# Patient Record
Sex: FEMALE | Race: BLACK OR AFRICAN AMERICAN | Employment: PART TIME | URBAN - METROPOLITAN AREA
[De-identification: names, ages, dates, MRNs, and addresses within clinical notes are randomized per-mention and may not be internally consistent; named-entity substitution may affect disease eponyms.]

---

## 2018-09-09 ENCOUNTER — HOSPITAL ENCOUNTER (EMERGENCY)
Age: 38
Discharge: HOME | End: 2018-09-09
Attending: EMERGENCY MEDICINE
Payer: SELF-PAY

## 2018-09-09 ENCOUNTER — APPOINTMENT (OUTPATIENT)
Dept: GENERAL RADIOLOGY | Age: 38
End: 2018-09-09
Attending: NURSE PRACTITIONER
Payer: SELF-PAY

## 2018-09-09 VITALS
HEART RATE: 99 BPM | BODY MASS INDEX: 20.09 KG/M2 | OXYGEN SATURATION: 100 % | SYSTOLIC BLOOD PRESSURE: 125 MMHG | WEIGHT: 125 LBS | DIASTOLIC BLOOD PRESSURE: 84 MMHG | TEMPERATURE: 97.4 F | RESPIRATION RATE: 20 BRPM | HEIGHT: 66 IN

## 2018-09-09 DIAGNOSIS — R09.82 POST-NASAL DRIP: ICD-10-CM

## 2018-09-09 DIAGNOSIS — J01.00 ACUTE NON-RECURRENT MAXILLARY SINUSITIS: Primary | ICD-10-CM

## 2018-09-09 DIAGNOSIS — R03.0 ELEVATED BLOOD PRESSURE READING: ICD-10-CM

## 2018-09-09 LAB
ANION GAP SERPL CALC-SCNC: 9 MMOL/L (ref 3–18)
BASOPHILS # BLD: 0 K/UL (ref 0–0.1)
BASOPHILS NFR BLD: 0 % (ref 0–2)
BUN SERPL-MCNC: 10 MG/DL (ref 7–18)
BUN/CREAT SERPL: 12 (ref 12–20)
CALCIUM SERPL-MCNC: 9.6 MG/DL (ref 8.5–10.1)
CHLORIDE SERPL-SCNC: 105 MMOL/L (ref 100–108)
CK MB CFR SERPL CALC: NORMAL % (ref 0–4)
CK MB SERPL-MCNC: <1 NG/ML (ref 5–25)
CK SERPL-CCNC: 122 U/L (ref 26–192)
CO2 SERPL-SCNC: 25 MMOL/L (ref 21–32)
CREAT SERPL-MCNC: 0.86 MG/DL (ref 0.6–1.3)
D DIMER PPP FEU-MCNC: <0.27 UG/ML(FEU)
DIFFERENTIAL METHOD BLD: ABNORMAL
EOSINOPHIL # BLD: 0.1 K/UL (ref 0–0.4)
EOSINOPHIL NFR BLD: 1 % (ref 0–5)
ERYTHROCYTE [DISTWIDTH] IN BLOOD BY AUTOMATED COUNT: 12.3 % (ref 11.6–14.5)
GLUCOSE SERPL-MCNC: 107 MG/DL (ref 74–99)
HCT VFR BLD AUTO: 41.3 % (ref 35–45)
HGB BLD-MCNC: 14.3 G/DL (ref 12–16)
LYMPHOCYTES # BLD: 1.4 K/UL (ref 0.9–3.6)
LYMPHOCYTES NFR BLD: 13 % (ref 21–52)
MCH RBC QN AUTO: 33.3 PG (ref 24–34)
MCHC RBC AUTO-ENTMCNC: 34.6 G/DL (ref 31–37)
MCV RBC AUTO: 96 FL (ref 74–97)
MONOCYTES # BLD: 0.7 K/UL (ref 0.05–1.2)
MONOCYTES NFR BLD: 7 % (ref 3–10)
NEUTS SEG # BLD: 8.3 K/UL (ref 1.8–8)
NEUTS SEG NFR BLD: 79 % (ref 40–73)
PLATELET # BLD AUTO: 287 K/UL (ref 135–420)
PMV BLD AUTO: 9.2 FL (ref 9.2–11.8)
POTASSIUM SERPL-SCNC: 4.1 MMOL/L (ref 3.5–5.5)
RBC # BLD AUTO: 4.3 M/UL (ref 4.2–5.3)
SODIUM SERPL-SCNC: 139 MMOL/L (ref 136–145)
TROPONIN I SERPL-MCNC: <0.02 NG/ML (ref 0–0.04)
WBC # BLD AUTO: 10.5 K/UL (ref 4.6–13.2)

## 2018-09-09 PROCEDURE — 99284 EMERGENCY DEPT VISIT MOD MDM: CPT

## 2018-09-09 PROCEDURE — 85379 FIBRIN DEGRADATION QUANT: CPT | Performed by: NURSE PRACTITIONER

## 2018-09-09 PROCEDURE — 80048 BASIC METABOLIC PNL TOTAL CA: CPT | Performed by: NURSE PRACTITIONER

## 2018-09-09 PROCEDURE — 71046 X-RAY EXAM CHEST 2 VIEWS: CPT

## 2018-09-09 PROCEDURE — 82550 ASSAY OF CK (CPK): CPT | Performed by: NURSE PRACTITIONER

## 2018-09-09 PROCEDURE — 74011250637 HC RX REV CODE- 250/637: Performed by: EMERGENCY MEDICINE

## 2018-09-09 PROCEDURE — 93005 ELECTROCARDIOGRAM TRACING: CPT

## 2018-09-09 PROCEDURE — 85025 COMPLETE CBC W/AUTO DIFF WBC: CPT | Performed by: NURSE PRACTITIONER

## 2018-09-09 RX ORDER — LORATADINE 10 MG/1
TABLET ORAL
Qty: 30 TAB | Refills: 0 | Status: SHIPPED | OUTPATIENT
Start: 2018-09-09

## 2018-09-09 RX ORDER — DIPHENHYDRAMINE HCL 50 MG
50 CAPSULE ORAL
Status: COMPLETED | OUTPATIENT
Start: 2018-09-09 | End: 2018-09-09

## 2018-09-09 RX ORDER — TRIAMCINOLONE ACETONIDE 55 UG/1
2 SPRAY, METERED NASAL DAILY
Qty: 1 BOTTLE | Refills: 0 | Status: SHIPPED | OUTPATIENT
Start: 2018-09-09

## 2018-09-09 RX ADMIN — DIPHENHYDRAMINE HYDROCHLORIDE 50 MG: 50 CAPSULE ORAL at 19:05

## 2018-09-09 NOTE — DISCHARGE INSTRUCTIONS
SPECIFIC PATIENT INSTRUCTIONS FROM THE PHYSICIAN WHO TREATED YOU IN THE ER TODAY:  1. Return if any concerns or worsening of condition(s)  2. FOLLOW UP APPOINTMENT:  Your primary doctor in 1 week. 3.  Nasacort AQ and claritin for the next week and then stop. If sinus congestion recurs, then restart the nasacort AQ and claritin for a week at a time. 4.  Use a Nedi Pot to help clear your sinuses. You may purchase the original Nedi Pot at a pharmacy or get a generic version at [employer] or [employer]. Sinusitis: Care Instructions  Your Care Instructions    Sinusitis is an infection of the lining of the sinus cavities in your head. Sinusitis often follows a cold. It causes pain and pressure in your head and face. In most cases, sinusitis gets better on its own in 1 to 2 weeks. But some mild symptoms may last for several weeks. Sometimes antibiotics are needed. Follow-up care is a key part of your treatment and safety. Be sure to make and go to all appointments, and call your doctor if you are having problems. It's also a good idea to know your test results and keep a list of the medicines you take. How can you care for yourself at home? · Take an over-the-counter pain medicine, such as acetaminophen (Tylenol), ibuprofen (Advil, Motrin), or naproxen (Aleve). Read and follow all instructions on the label. · If the doctor prescribed antibiotics, take them as directed. Do not stop taking them just because you feel better. You need to take the full course of antibiotics. · Be careful when taking over-the-counter cold or flu medicines and Tylenol at the same time. Many of these medicines have acetaminophen, which is Tylenol. Read the labels to make sure that you are not taking more than the recommended dose. Too much acetaminophen (Tylenol) can be harmful. · Breathe warm, moist air from a steamy shower, a hot bath, or a sink filled with hot water. Avoid cold, dry air.  Using a humidifier in your home may help. Follow the directions for cleaning the machine. · Use saline (saltwater) nasal washes to help keep your nasal passages open and wash out mucus and bacteria. You can buy saline nose drops at a grocery store or drugstore. Or you can make your own at home by adding 1 teaspoon of salt and 1 teaspoon of baking soda to 2 cups of distilled water. If you make your own, fill a bulb syringe with the solution, insert the tip into your nostril, and squeeze gently. Lesli Day your nose. · Put a hot, wet towel or a warm gel pack on your face 3 or 4 times a day for 5 to 10 minutes each time. · Try a decongestant nasal spray like oxymetazoline (Afrin). Do not use it for more than 3 days in a row. Using it for more than 3 days can make your congestion worse. When should you call for help? Call your doctor now or seek immediate medical care if:    · You have new or worse swelling or redness in your face or around your eyes.     · You have a new or higher fever.    Watch closely for changes in your health, and be sure to contact your doctor if:    · You have new or worse facial pain.     · The mucus from your nose becomes thicker (like pus) or has new blood in it.     · You are not getting better as expected. Where can you learn more? Go to http://katiana-laura.info/. Enter M998 in the search box to learn more about \"Sinusitis: Care Instructions. \"  Current as of: May 12, 2017  Content Version: 11.7  © 8698-4122 [employer]. Care instructions adapted under license by [employer] (which disclaims liability or warranty for this information). If you have questions about a medical condition or this instruction, always ask your healthcare professional. [place] [address] any warranty or liability for your use of this information.          Saline Nasal Washes: Care Instructions  Your Care Instructions  Saline nasal washes help keep the nasal passages open by washing out thick or dried mucus. This simple remedy can help relieve symptoms of allergies, sinusitis, and colds. It also can make the nose feel more comfortable by keeping the mucous membranes moist. You may notice a little burning sensation in your nose the first few times you use the solution, but this usually gets better in a few days. Follow-up care is a key part of your treatment and safety. Be sure to make and go to all appointments, and call your doctor if you are having problems. It's also a good idea to know your test results and keep a list of the medicines you take. How can you care for yourself at home? · You can buy premixed saline solution in a squeeze bottle or other sinus rinse products at a drugstore. Read and follow the instructions on the label. · You also can make your own saline solution by adding 1 teaspoon of salt and 1 teaspoon of baking soda to 2 cups of distilled water. · If you use a homemade solution, pour a small amount into a clean bowl. Using a rubber bulb syringe, squeeze the syringe and place the tip in the salt water. Pull a small amount of the salt water into the syringe by relaxing your hand. · Sit down with your head tilted slightly back. Do not lie down. Put the tip of the bulb syringe or the squeeze bottle a little way into one of your nostrils. Gently drip or squirt a few drops into the nostril. Repeat with the other nostril. Some sneezing and gagging are normal at first.  · Gently blow your nose. · Wipe the syringe or bottle tip clean after each use. · Repeat this 2 or 3 times a day. · Use nasal washes gently if you have nosebleeds often. When should you call for help? Watch closely for changes in your health, and be sure to contact your doctor if:    · You often get nosebleeds.     · You have problems doing the nasal washes. Where can you learn more? Go to http://katiana-laura.info/.   Enter [phone] [id number] [phone] in the search box to learn more about \"Saline Nasal Washes: Care Instructions. \"  Current as of: May 12, 2017  Content Version: 11.7  © 2006-2018 [employer]. Care instructions adapted under license by [employer] (which disclaims liability or warranty for this information). If you have questions about a medical condition or this instruction, always ask your healthcare professional. [place] [address] any warranty or liability for your use of this information. The Sinuses: Anatomy Sketch    Current as of: May 12, 2017  Content Version: 11.7  © 2006-2018 [employer]. Care instructions adapted under license by [employer] (which disclaims liability or warranty for this information). If you have questions about a medical condition or this instruction, always ask your healthcare professional. [address] any warranty or liability for your use of this information. Elevated Blood Pressure: Care Instructions  Your Care Instructions    Blood pressure is a measure of how hard the blood pushes against the walls of your arteries. It's normal for blood pressure to go up and down throughout the day. But if it stays up over time, you have high blood pressure. Two numbers tell you your blood pressure. The first number is the systolic pressure. It shows how hard the blood pushes when your heart is pumping. The second number is the diastolic pressure. It shows how hard the blood pushes between heartbeats, when your heart is relaxed and filling with blood. An ideal blood pressure in adults is less than 120/80 (say \"120 over 80\"). High blood pressure is 140/90 or higher. You have high blood pressure if your top number is 140 or higher or your bottom number is 90 or higher, or both. The main test for high blood pressure is simple, fast, and painless. To diagnose high blood pressure, your doctor will test your blood pressure at different times.  After testing your blood pressure, your doctor may ask you to test it again when you are home. If you are diagnosed with high blood pressure, you can work with your doctor to make a long-term plan to manage it. Follow-up care is a key part of your treatment and safety. Be sure to make and go to all appointments, and call your doctor if you are having problems. It's also a good idea to know your test results and keep a list of the medicines you take. How can you care for yourself at home? · Do not smoke. Smoking increases your risk for heart attack and stroke. If you need help quitting, talk to your doctor about stop-smoking programs and medicines. These can increase your chances of quitting for good. · Stay at a healthy weight. · Try to limit how much sodium you eat to less than 2,300 milligrams (mg) a day. Your doctor may ask you to try to eat less than 1,500 mg a day. · Be physically active. Get at least 30 minutes of exercise on most days of the week. Walking is a good choice. You also may want to do other activities, such as running, swimming, cycling, or playing tennis or team sports. · Avoid or limit alcohol. Talk to your doctor about whether you can drink any alcohol. · Eat plenty of fruits, vegetables, and low-fat dairy products. Eat less saturated and total fats. · Learn how to check your blood pressure at home. When should you call for help? Call your doctor now or seek immediate medical care if:  ? · Your blood pressure is much higher than normal (such as 180/110 or higher). ? · You think high blood pressure is causing symptoms such as:  ¨ Severe headache. ¨ Blurry vision. ? Watch closely for changes in your health, and be sure to contact your doctor if:  ? · You do not get better as expected. Where can you learn more? Go to http://katiana-laura.info/. Enter N723 in the search box to learn more about \"Elevated Blood Pressure: Care Instructions. \"  Current as of: September 21, 2016  Content Version: 11.4  © 1809-0985 [employer]. Care instructions adapted under license by [employer] (which disclaims liability or warranty for this information). If you have questions about a medical condition or this instruction, always ask your healthcare professional. [address] any warranty or liability for your use of this information. MyChart Activation    Thank you for requesting access to [employer]. Please follow the instructions below to securely access and download your online medical record. [employer] allows you to send messages to your doctor, view your test results, renew your prescriptions, schedule appointments, and more. How Do I Sign Up? 1. In your internet browser, go to [email or web address]. 2. Click on the First Time User? Click Here link in the Sign In box. You will see the New Member Sign Up page. 3. Enter your [employer] Access Code exactly as it appears below. You will not need to use this code after youve completed the sign-up process. If you do not sign up before the expiration date, you must request a new code. [employer] Access Code: [email or web address]  Expires: 2018  3:03 PM (This is the date your [employer] access code will )    4. Enter the last four digits of your Social Security Number (xxxx) and Date of Birth (mm/dd/yyyy) as indicated and click Submit. You will be taken to the next sign-up page. 5. Create a [employer] ID. This will be your [employer] login ID and cannot be changed, so think of one that is secure and easy to remember. 6. Create a [employer] password. You can change your password at any time. 7. Enter your Password Reset Question and Answer. This can be used at a later time if you forget your password. 8. Enter your e-mail address. You will receive e-mail notification when new information is available in [address] 9. Click Sign Up. You can now view and download portions of your medical record.   10. Click the Download Summary menu link to download a portable copy of your medical information. Additional Information    If you have questions, please visit the Frequently Asked Questions section of the [employer] website at [email or web address] Remember, [employer] is NOT to be used for urgent needs. For medical emergencies, dial 911.

## 2018-09-09 NOTE — ED PROVIDER NOTES
[facility] EMERGENCY DEPT      6:24 PM    Date: 9/9/2018  Patient Name: Leonidas Reynoso    History of Presenting Illness     Chief Complaint   Patient presents with    Shortness of Breath       History Provided By: Patient    Chief Complaint: SOB  Duration: 16 Hours  Timing:  Acute  Location: Generalized   Quality: N/A  Severity: Moderate  Modifying Factors: Nothing makes it better or worse  Associated Symptoms: Vomiting, nausea, and rhinorrhea    40 y.o. female with noted past medical history who presents to the emergency department with acute, moderate, generalized SOB with associated vomiting, nausea, and rhinorrhea onset x 16 hours ago. Pt states around 2 AM this morning she became short of breath and starting vomiting. Pt tried a herbal tea for her symptoms and had no significant relief. Denies having a Hx of heart problems. No other complaints. Nursing notes regarding the HPI and triage nursing notes were reviewed. Prior medical records were reviewed. Current Facility-Administered Medications   Medication Dose Route Frequency Provider Last Rate Last Dose    diphenhydrAMINE (BENADRYL) capsule 50 mg  50 mg Oral NOW Kelly Beatty MD         Current Outpatient Prescriptions   Medication Sig Dispense Refill    omeprazole (PRILOSEC PO) Take  by mouth.  loratadine (CLARITIN) 10 mg tablet Take 1 tab by mouth daily for seven (7) days. Continue after 7 days only if symptoms are still present. Restart for 7 day intervals if sinus congestion symptoms return. 30 Tab 0    triamcinolone (NASACORT AQ) 55 mcg nasal inhaler 2 Sprays by Both Nostrils route daily. 1 Bottle 0       Past History     Past Medical History:  Past Medical History:   Diagnosis Date    GERD (gastroesophageal reflux disease)        Past Surgical History:  History reviewed. No pertinent surgical history. Family History:  History reviewed. No pertinent family history.     Social History:  Social History   Substance Use Topics    Smoking status: None    Smokeless tobacco: None    Alcohol use None       Allergies:  No Known Allergies    Patient's primary care provider (as noted in EPIC):  Laurie Pizarro MD    Review of Systems   Constitutional: Negative for diaphoresis. HENT: Positive for rhinorrhea. Negative for congestion. Eyes: Negative for discharge. Respiratory: Positive for shortness of breath. Negative for stridor. Cardiovascular: Negative for palpitations. Gastrointestinal: Positive for nausea and vomiting. Negative for diarrhea. Genitourinary: Negative for flank pain. Musculoskeletal: Negative for back pain. Neurological: Negative for weakness. Psychiatric/Behavioral: Negative for hallucinations. All other systems reviewed and are negative. Visit Vitals    /83    Pulse 99    Temp 97.4 °F (36.3 °C)    Resp 20    Ht 5' 6\" (1.676 m)    Wt 56.7 kg (125 lb)    SpO2 98%    BMI 20.18 kg/m2       PHYSICAL EXAM:    CONSTITUTIONAL:  Alert, in no apparent distress;  well developed;  well nourished. HEAD:  Normocephalic, atraumatic. Sinuses:  Sinus pressure with leaning head forward. Sinus tenderness to percussion. EYES:  EOMI. Non-icteric sclera. Normal conjunctiva. ENTM:  Nose:  no rhinorrhea. Throat:  no erythema or exudate, mucous membranes moist.  NECK:  No JVD. Supple  RESPIRATORY:  Chest clear, equal breath sounds, good air movement. CARDIOVASCULAR:  Regular rate and rhythm. No murmurs, rubs, or gallops. GI:  Normal bowel sounds, abdomen soft and non-tender. No rebound or guarding. BACK:  Non-tender. UPPER EXT:  Normal inspection. LOWER EXT:  No edema, no calf tenderness. Distal pulses intact. NEURO:  Moves all four extremities, and grossly normal motor exam.  SKIN:  No rashes;  Normal for age. PSYCH:  Alert and normal affect.     Diagnostic Study Results     Abnormal lab results from this emergency department encounter:  Labs Reviewed   [address] 256 DIFF - Abnormal; Notable for the following:        Result Value    NEUTROPHILS 79 (*)     LYMPHOCYTES 13 (*)     ABS. NEUTROPHILS 8.3 (*)     All other components within normal limits   METABOLIC PANEL, BASIC - Abnormal; Notable for the following:     Glucose 107 (*)     All other components within normal limits   D DIMER   CARDIAC PANEL,(CK, CKMB & TROPONIN)       Lab values for this patient within approximately the last 12 hours:  Recent Results (from the past 12 hour(s))   CBC WITH AUTOMATED DIFF    Collection Time: 09/09/18  4:15 PM   Result Value Ref Range    WBC 10.5 4.6 - 13.2 K/uL    RBC 4.30 4.20 - 5.30 M/uL    HGB 14.3 12.0 - 16.0 g/dL    HCT 41.3 35.0 - 45.0 %    MCV 96.0 74.0 - 97.0 FL    MCH 33.3 24.0 - 34.0 PG    MCHC 34.6 31.0 - 37.0 g/dL    RDW 12.3 11.6 - 14.5 %    PLATELET 204 725 - 070 K/uL    MPV 9.2 9.2 - 11.8 FL    NEUTROPHILS 79 (H) 40 - 73 %    LYMPHOCYTES 13 (L) 21 - 52 %    MONOCYTES 7 3 - 10 %    EOSINOPHILS 1 0 - 5 %    BASOPHILS 0 0 - 2 %    ABS. NEUTROPHILS 8.3 (H) 1.8 - 8.0 K/UL    ABS. LYMPHOCYTES 1.4 0.9 - 3.6 K/UL    ABS. MONOCYTES 0.7 0.05 - 1.2 K/UL    ABS. EOSINOPHILS 0.1 0.0 - 0.4 K/UL    ABS.  BASOPHILS 0.0 0.0 - 0.1 K/UL    DF AUTOMATED     METABOLIC PANEL, BASIC    Collection Time: 09/09/18  4:15 PM   Result Value Ref Range    Sodium 139 136 - 145 mmol/L    Potassium 4.1 3.5 - 5.5 mmol/L    Chloride 105 100 - 108 mmol/L    CO2 25 21 - 32 mmol/L    Anion gap 9 3.0 - 18 mmol/L    Glucose 107 (H) 74 - 99 mg/dL    BUN 10 7.0 - 18 MG/DL    Creatinine 0.86 0.6 - 1.3 MG/DL    BUN/Creatinine ratio 12 12 - 20      GFR est AA >60 >60 ml/min/1.73m2    GFR est non-AA >60 >60 ml/min/1.73m2    Calcium 9.6 8.5 - 10.1 MG/DL   D DIMER    Collection Time: 09/09/18  4:15 PM   Result Value Ref Range    D DIMER <0.27 <0.46 ug/ml(U)   CARDIAC PANEL,(CK, CKMB & TROPONIN)    Collection Time: 09/09/18  4:15 PM   Result Value Ref Range     26 - 192 U/L    CK - MB <1.0 <3.6 ng/ml    CK-MB Index 0.0 - 4.0 %     CALCULATION NOT PERFORMED WHEN RESULT IS BELOW LINEAR LIMIT    Troponin-I, Qt. <0.02 0.0 - 0.045 NG/ML   EKG, 12 LEAD, INITIAL    Collection Time: 09/09/18  4:30 PM   Result Value Ref Range    Ventricular Rate 101 BPM    Atrial Rate 122 BPM    P-R Interval 120 ms    QRS Duration 92 ms    Q-T Interval 344 ms    QTC Calculation (Bezet) 446 ms    Calculated P Axis 106 degrees    Calculated R Axis 136 degrees    Calculated T Axis 128 degrees    Diagnosis       Suspect arm lead reversal, interpretation assumes no reversal  Sinus tachycardia with frequent premature ventricular complexes and fusion   complexes  Right axis deviation  Abnormal ECG  No previous ECGs available         Radiologist and cardiologist interpretations if available at time of this note:  No results found. PA and lateral CXR:  No acute disease on chest XR. Medication(s) ordered for patient during this emergency visit encounter:  Medications   diphenhydrAMINE (BENADRYL) capsule 50 mg (not administered)       Medical Decision Making     I am the first provider for this patient. I reviewed the vital signs, available nursing notes, past medical history, past surgical history, family history and social history. Vital Signs:  Reviewed the patient's vital signs. SPECIFIC PATIENT INSTRUCTIONS FROM THE PHYSICIAN WHO TREATED YOU IN THE ER TODAY:  1. Return if any concerns or worsening of condition(s)  2. FOLLOW UP APPOINTMENT:  Your primary doctor in 1 week. 3.  Nasacort AQ and claritin for the next week and then stop. If sinus congestion recurs, then restart the nasacort AQ and claritin for a week at a time. 4.  Use a Nedi Pot to help clear your sinuses. You may purchase the original Nedi Pot at a pharmacy or get a generic version at [employer] or [employer]. Patient is improved, resting quietly and comfortably. The patient will be discharged home.      The patient was reassured that these symptoms do not appear to represent a serious or life threatening condition at this time. Warning signs of worsening condition were discussed and understood by the patient. Based on patient's age, coexisting illness, exam, and the results of this ED evaluation, the decision to treat as an outpatient was made. Based on the information available at time of discharge, acute pathology requiring immediate intervention was deemed relative unlikely. While it is impossible to completely exclude the possibility of underlying serious disease or worsening of condition, I feel the relative likelihood is extremely low. I discussed this uncertainty with the patient, who understood ED evaluation and treatment and felt comfortable with the outpatient treatment plan. All questions regarding care, test results, and follow up were answered. The patient is stable and appropriate to discharge. They understand that they should return to the emergency department for any new or worsening symptoms. I stressed the importance of follow up for repeat assessment and possibly further evaluation/treatment. Coding Diagnoses     Clinical Impression:   1. Acute non-recurrent maxillary sinusitis    2. Post-nasal drip    3. Elevated blood pressure reading        Disposition     Disposition:  Home. JUN Stearns Board Certified Emergency Physician    Provider Attestation:  If a scribe was utilized in generation of this patient record, I personally performed the services described in the documentation, reviewed the documentation, as recorded by the scribe in my presence, and it accurately records the patient's history of presenting illness, review of systems, patient physical examination, and procedures performed by me as the attending physician. JUN Stearns Board Certified Emergency Physician  9/9/2018.  6:24 PM    Scribe [address] [place] [address] acting as a scribe for and in the presence of Tosha Juarez MD      September 09, 2018 at 6:25 PM       Provider Attestation:      I personally performed the services described in the documentation, reviewed the documentation, as recorded by the scribe in my presence, and it accurately and completely records my words and actions.  September 09, 2018 at 6:25 PM - Tosha Juarez MD

## 2018-09-09 NOTE — ED TRIAGE NOTES
Nasal congestion last night. Today had sudden shortness of breath and feels tight in chest.   Patient is very tearful and scared in triage. Constantly spitting of mucous. States she has never felt this way even with nasal drainage.

## 2018-09-10 LAB
ATRIAL RATE: 122 BPM
CALCULATED P AXIS, ECG09: 106 DEGREES
CALCULATED R AXIS, ECG10: 136 DEGREES
CALCULATED T AXIS, ECG11: 128 DEGREES
DIAGNOSIS, 93000: NORMAL
P-R INTERVAL, ECG05: 120 MS
Q-T INTERVAL, ECG07: 344 MS
QRS DURATION, ECG06: 92 MS
QTC CALCULATION (BEZET), ECG08: 446 MS
VENTRICULAR RATE, ECG03: 101 BPM

## 2023-01-31 RX ORDER — HYDROXYZINE 50 MG/1
50 TABLET, FILM COATED ORAL DAILY PRN
COMMUNITY

## 2023-01-31 RX ORDER — OMEPRAZOLE 20 MG/1
20 CAPSULE, DELAYED RELEASE ORAL DAILY
COMMUNITY

## 2023-01-31 RX ORDER — ONDANSETRON 4 MG/1
4 TABLET, FILM COATED ORAL EVERY 8 HOURS PRN
COMMUNITY

## 2023-01-31 RX ORDER — LORATADINE 10 MG/1
TABLET ORAL
COMMUNITY
Start: 2018-09-09

## 2023-01-31 RX ORDER — ACETAMINOPHEN 500 MG
1000 TABLET ORAL EVERY 6 HOURS PRN
COMMUNITY

## 2023-01-31 RX ORDER — TRIAMCINOLONE ACETONIDE 55 UG/1
2 SPRAY, METERED NASAL DAILY
COMMUNITY
Start: 2018-09-09